# Patient Record
Sex: FEMALE | Race: OTHER | HISPANIC OR LATINO | URBAN - METROPOLITAN AREA
[De-identification: names, ages, dates, MRNs, and addresses within clinical notes are randomized per-mention and may not be internally consistent; named-entity substitution may affect disease eponyms.]

---

## 2024-01-31 ENCOUNTER — EMERGENCY (EMERGENCY)
Facility: HOSPITAL | Age: 29
LOS: 0 days | Discharge: HOME | End: 2024-01-31
Attending: EMERGENCY MEDICINE
Payer: COMMERCIAL

## 2024-01-31 VITALS
TEMPERATURE: 98 F | HEART RATE: 71 BPM | DIASTOLIC BLOOD PRESSURE: 85 MMHG | SYSTOLIC BLOOD PRESSURE: 128 MMHG | OXYGEN SATURATION: 100 %

## 2024-01-31 VITALS — WEIGHT: 214.95 LBS | HEIGHT: 62 IN

## 2024-01-31 DIAGNOSIS — D72.829 ELEVATED WHITE BLOOD CELL COUNT, UNSPECIFIED: ICD-10-CM

## 2024-01-31 DIAGNOSIS — N93.9 ABNORMAL UTERINE AND VAGINAL BLEEDING, UNSPECIFIED: ICD-10-CM

## 2024-01-31 LAB
ABO RH CONFIRMATION: SIGNIFICANT CHANGE UP
ALBUMIN SERPL ELPH-MCNC: 3.6 G/DL — SIGNIFICANT CHANGE UP (ref 3.3–5)
ALP SERPL-CCNC: 83 U/L — SIGNIFICANT CHANGE UP (ref 40–120)
ALT FLD-CCNC: 28 U/L — SIGNIFICANT CHANGE UP (ref 12–78)
ANION GAP SERPL CALC-SCNC: 3 MMOL/L — LOW (ref 5–17)
APTT BLD: 38.1 SEC — HIGH (ref 24.5–35.6)
AST SERPL-CCNC: 19 U/L — SIGNIFICANT CHANGE UP (ref 15–37)
BASOPHILS # BLD AUTO: 0.04 K/UL — SIGNIFICANT CHANGE UP (ref 0–0.2)
BASOPHILS NFR BLD AUTO: 0.3 % — SIGNIFICANT CHANGE UP (ref 0–2)
BILIRUB SERPL-MCNC: 0.3 MG/DL — SIGNIFICANT CHANGE UP (ref 0.2–1.2)
BLD GP AB SCN SERPL QL: SIGNIFICANT CHANGE UP
BUN SERPL-MCNC: 17 MG/DL — SIGNIFICANT CHANGE UP (ref 7–23)
CALCIUM SERPL-MCNC: 8.9 MG/DL — SIGNIFICANT CHANGE UP (ref 8.5–10.1)
CHLORIDE SERPL-SCNC: 111 MMOL/L — HIGH (ref 96–108)
CO2 SERPL-SCNC: 26 MMOL/L — SIGNIFICANT CHANGE UP (ref 22–31)
CREAT SERPL-MCNC: 0.73 MG/DL — SIGNIFICANT CHANGE UP (ref 0.5–1.3)
EGFR: 115 ML/MIN/1.73M2 — SIGNIFICANT CHANGE UP
EOSINOPHIL # BLD AUTO: 0.15 K/UL — SIGNIFICANT CHANGE UP (ref 0–0.5)
EOSINOPHIL NFR BLD AUTO: 1.3 % — SIGNIFICANT CHANGE UP (ref 0–6)
GLUCOSE SERPL-MCNC: 102 MG/DL — HIGH (ref 70–99)
HCG SERPL-ACNC: <1 MIU/ML — SIGNIFICANT CHANGE UP
HCT VFR BLD CALC: 39.2 % — SIGNIFICANT CHANGE UP (ref 34.5–45)
HGB BLD-MCNC: 13.2 G/DL — SIGNIFICANT CHANGE UP (ref 11.5–15.5)
IMM GRANULOCYTES NFR BLD AUTO: 0.3 % — SIGNIFICANT CHANGE UP (ref 0–0.9)
INR BLD: 1.15 RATIO — SIGNIFICANT CHANGE UP (ref 0.85–1.18)
LYMPHOCYTES # BLD AUTO: 26.8 % — SIGNIFICANT CHANGE UP (ref 13–44)
LYMPHOCYTES # BLD AUTO: 3.21 K/UL — SIGNIFICANT CHANGE UP (ref 1–3.3)
MCHC RBC-ENTMCNC: 28.9 PG — SIGNIFICANT CHANGE UP (ref 27–34)
MCHC RBC-ENTMCNC: 33.7 GM/DL — SIGNIFICANT CHANGE UP (ref 32–36)
MCV RBC AUTO: 86 FL — SIGNIFICANT CHANGE UP (ref 80–100)
MONOCYTES # BLD AUTO: 0.65 K/UL — SIGNIFICANT CHANGE UP (ref 0–0.9)
MONOCYTES NFR BLD AUTO: 5.4 % — SIGNIFICANT CHANGE UP (ref 2–14)
NEUTROPHILS # BLD AUTO: 7.91 K/UL — HIGH (ref 1.8–7.4)
NEUTROPHILS NFR BLD AUTO: 65.9 % — SIGNIFICANT CHANGE UP (ref 43–77)
PLATELET # BLD AUTO: 210 K/UL — SIGNIFICANT CHANGE UP (ref 150–400)
POTASSIUM SERPL-MCNC: 3.9 MMOL/L — SIGNIFICANT CHANGE UP (ref 3.5–5.3)
POTASSIUM SERPL-SCNC: 3.9 MMOL/L — SIGNIFICANT CHANGE UP (ref 3.5–5.3)
PROT SERPL-MCNC: 7.5 GM/DL — SIGNIFICANT CHANGE UP (ref 6–8.3)
PROTHROM AB SERPL-ACNC: 12.9 SEC — SIGNIFICANT CHANGE UP (ref 9.5–13)
RBC # BLD: 4.56 M/UL — SIGNIFICANT CHANGE UP (ref 3.8–5.2)
RBC # FLD: 12.3 % — SIGNIFICANT CHANGE UP (ref 10.3–14.5)
SODIUM SERPL-SCNC: 140 MMOL/L — SIGNIFICANT CHANGE UP (ref 135–145)
WBC # BLD: 12 K/UL — HIGH (ref 3.8–10.5)
WBC # FLD AUTO: 12 K/UL — HIGH (ref 3.8–10.5)

## 2024-01-31 PROCEDURE — 76830 TRANSVAGINAL US NON-OB: CPT

## 2024-01-31 PROCEDURE — 86901 BLOOD TYPING SEROLOGIC RH(D): CPT

## 2024-01-31 PROCEDURE — 85610 PROTHROMBIN TIME: CPT

## 2024-01-31 PROCEDURE — 99284 EMERGENCY DEPT VISIT MOD MDM: CPT

## 2024-01-31 PROCEDURE — 86850 RBC ANTIBODY SCREEN: CPT

## 2024-01-31 PROCEDURE — 36415 COLL VENOUS BLD VENIPUNCTURE: CPT

## 2024-01-31 PROCEDURE — 85730 THROMBOPLASTIN TIME PARTIAL: CPT

## 2024-01-31 PROCEDURE — 80053 COMPREHEN METABOLIC PANEL: CPT

## 2024-01-31 PROCEDURE — 86900 BLOOD TYPING SEROLOGIC ABO: CPT

## 2024-01-31 PROCEDURE — 85025 COMPLETE CBC W/AUTO DIFF WBC: CPT

## 2024-01-31 PROCEDURE — 76830 TRANSVAGINAL US NON-OB: CPT | Mod: 26

## 2024-01-31 PROCEDURE — 99284 EMERGENCY DEPT VISIT MOD MDM: CPT | Mod: 25

## 2024-01-31 PROCEDURE — 84702 CHORIONIC GONADOTROPIN TEST: CPT

## 2024-01-31 NOTE — ED ADULT TRIAGE NOTE - CHIEF COMPLAINT QUOTE
pt presents to the ED c/o heavy vaginal bleeding and cramping for 10 days. reports going to family services in November and states "I got a shot and this is my first time bleeding since November."  reports pain in pelvic area. denies dizziness. pt A&Ox4, well appearing, and ambulatory at baseline with no further complaints or discomforts reported at this time.

## 2024-01-31 NOTE — ED STATDOCS - CLINICAL SUMMARY MEDICAL DECISION MAKING FREE TEXT BOX
In summary this is a 28-year-old female who presents chief complaint of vaginal bleeding.  Vital signs are within normal limits on arrival.  Labs reveal mild leukocytosis 12.0, normal hemoglobin 13.2, normal platelets, normal coags.  CMP was unremarkable, hCG was undetectable.  Ultrasound of the pelvis demonstrates heterogeneous thickened endometrium measuring up to 22 mm, no internal vascularity.  Patient hemodynamically stable, with normal hemoglobin, likely represents abnormal uterine bleeding on first menstruation status post last Depo injection.  Okay for discharge home at this time with supportive care.  Recommend close follow-up with her gynecologist.  Cannot take NSAIDs as needed to help with bleeding and pelvic pain.  Strict return precautions given for any worsening.  Patient verbalized understanding agrees plan this time.

## 2024-01-31 NOTE — ED STATDOCS - PATIENT PORTAL LINK FT
You can access the FollowMyHealth Patient Portal offered by [facility] by registering at the following website: [email or web address]. By joining [facility]’s FollowMyHealth portal, you will also be able to view your health information using other applications (apps) compatible with our system.

## 2024-01-31 NOTE — ED STATDOCS - PROGRESS NOTE DETAILS
signed Aruna Joy PA-C Pt seen initially in intake by Dr Avelar.   28F c/o vaginal bleeding for 12 days, a pad an hour with clots. LMP 12/20. She gets depo provera shot. GYN Domingo, she has appt for 2/5. Today at work her vision was going 'dark' so she came to ER for eval. Pt alert, NAD. plan labs, sono, re-eval. Pt agrees with plan of  care. Pt signed out to me by Aruna Joy PA-C. Labs and imaging reviewed. H/H stable, US demonstrated thickened endometrium, no other abnormalities. D/w patient. Stable for dc home with GYN follow up. Strict return precautions were given. All questions and concerns were addressed. - LEONA LoweryC

## 2024-01-31 NOTE — ED STATDOCS - OBJECTIVE STATEMENT
29 y/o female presents to the ED c/o vaginal bleeding associated with clots for the past 2 weeks. Pt states back in November she received contraceptive injection several months ago and has not had period since prior to this episode., no hx of abnormal vaginale bleeding in the past. Pt is going through 3 pads an hour, had one episode of lightheadedness. Pt has GYN  f/u on 2/5/24    GYN: Domingo   : language line: [id number]

## 2024-01-31 NOTE — ED ADULT NURSE NOTE - NSFALLUNIVINTERV_ED_ALL_ED
Bed/Stretcher in lowest position, wheels locked, appropriate side rails in place/Call bell, personal items and telephone in reach/Instruct patient to call for assistance before getting out of bed/chair/stretcher/Non-slip footwear applied when patient is off stretcher/Whitt to call system/Physically safe environment - no spills, clutter or unnecessary equipment/Purposeful proactive rounding/Room/bathroom lighting operational, light cord in reach

## 2024-01-31 NOTE — ED STATDOCS - NSFOLLOWUPINSTRUCTIONS_ED_ALL_ED_FT
Seguimiento con tu ginecóloga. Regrese al Departamento de Emergencias si los síntomas empeoran o persisten, y/o CUALQUIER SÍNTOMA NUEVO O PREOCUPANTE. Si tiene problemas para obtener un seguimiento, llame al: [phone] ([phone]) o al [phone] para obtener un médico o especialista que acepte duldey seguro en dudley área.    Hemorragia uterina anormal (disfuncional)    LO QUE NECESITA SABER:    ¿Qué es la hemorragia uterina anormal (ALEXANDREA)?La hemorragia uterina anormal es fermin hemorragia uterina que no es habitual en usted. También puede llamarse hemorragia uterina disfuncional. Es posible que dudley útero patrick en un momento que no sea dudley período menstrual regular. Kaela menstruaciones pueden durar más o ser más cortos, y podría sangrar fermin mayor o chucho cantidad que de costumbre. La hemorragia uterina anormal puede ser aguda (de corta duración) o crónica (de más de 6 meses).  Sistema reproductor femenino    ¿Qué causa la hemorragia uterina anormal?Los siguientes podrían causar o aumentar dudley riesgo de hemorragia uterina anormal:    Edad mayor de 40 años o chucho de 14 años    Niveles muy altos o muy bajos de estrógeno    Un ovario que no libera un óvulo corrine la ovulación    Fermin afección médica, yosvany el síndrome de ovario poliquístico (SOPQ), diabetes o fermin enfermedad renal o hepática a barry plazo    No mukesh a royer    Crecimientos, yosvany un tumor o un pólipo    Traumatismo, yosvany fermin lesión en el útero o el radha uterino    Fermin infección, yosvany fermin infección de transmisión sexual    Hipertiroidismo, hipotiroidismo o glándula suprarrenal poco activa    Un trastorno alimentario, yosvany la bulimia o la anorexia, o demasiado ejercicio    Ciertos medicamentos o terapia de reemplazo hormonal    Mucho aumento o mucha pérdida de peso  ¿Cuáles son los signos y síntomas de la hemorragia uterina anormal?    Sangrado o manchas de patrick entre los períodos menstruales    Hemorragia que comienza 12 meses o más tarde después de elaine pasado la menopausia    Sangrado más o menos abundante que de costumbre corrine kaela períodos menstruales    Sangra más de lo usual o más de 7 días corrine dudley período menstrual regular    Sangra menos que de costumbre o menos de 2 días    Intervalo de tiempo más corto o más barry que de costumbre entre kaela períodos menstruales  ¿Cómo se diagnostica la hemorragia uterina anormal?Dudley médico le preguntará sobre kaela períodos menstruales. Infórmele sobre todos los cambios en kaela períodos. Incluya cuándo comenzaron los cambios. Dudley médico puede preguntarle la edad que tenía cuando tuvo dudley primer período. Puede preguntarle si usa tampones o toallas sanitarias. Infórmele de cualquier afección médica que tenga y de todos los medicamentos que tabatha actualmente. Podría necesitar cualquiera de los siguientes, dependiendo de dudley edad y kaela antecedentes médicos:    Los análisis de sangrese pueden hacer para encontrar la causa de dudley hemorragia uterina anormal y los problemas que causa, yosvany la anemia. Es posible que dudley médico le recomiende la detección de fermin ITS yosvany parte de los análisis de patrick.    Un examen pélvicopara encontrar la causa de dudley hemorragia.    Fermin histeroscopiaes un procedimiento para examinar dudley endometrio. El endometrio es el revestimiento por dentro del útero. Dudley médico insertará en dudley útero un tubo pequeño con fermin cámara en la punta.    Fermin biopsiaes un procedimiento para extraer fermin pequeña muestra de tejido del endometrio. El tejido se envía a un laboratorio para hacerle exámenes.    Fermin ecografíausa ondas sonoras para mostrar imágenes de dudley útero, ovarios, trompas de Falopio y vagina en fermin pantalla.    Fermin prueba de Papanicolaoupodría ser necesario. Dudley médico tomará fermin muestra de tejido de dudley radha uterino y la enviará a un laboratorio para analizarla.  ¿Cómo se trata la hemorragia uterina anormal?    Los medicamentospueden administrarse para tratar fermin afección que cause fermin hemorragia uterina anormal. Por ejemplo, un medicamento para aumentar o disminuir la cantidad de hormona producida por la tiroides. Las hormonas podrían administrarse para ayudar a reducir la hemorragia regularizando los períodos menstruales. A veces naye medicamento se administra en forma de pastillas anticonceptivas. Pueden administrarle suplementos de tino si dudley nivel de tino en la patrick disminuye debido a la hemorragia intensa.    Procedimientoscomo la ablación del endometrio o la dilatación y el legrado pueden utilizarse para controlar la hemorragia.    La cirugíapueden ser necesarios si no se pueden usar medicamentos o los medicamentos no dan resultado. Es posible que necesite fermin histerectomía abdominal o vaginal. Fermin histerectomía es fermin cirugía para extraer el útero.  ¿Cómo me cuido en mi hogar?    Consuma alimentos con un alto contenido de tino de ser necesario.Las verduras de hoja conner, la carne de res, puerco, hígado, los huevos y los panes y cereales integrales son algunos ejemplos de alimentos con un alto contenido de tino.  Amin de tino      Lleve un diario de kaela períodos menstruales.Anote la cantidad de tampones o toallas higiénicas que usa a diario.    Consulte con dudley médico antes de comenzar un programa para bajar de peso.Es posible que deba esperar a que la hemorragia anormal se haya detenido para tratar de bajar de peso. La cantidad de tino en dudley patrick debería ser normal antes de perder peso. Pregunte a dudley médico si la pérdida de peso ayudará a controlar la hemorragia uterina anormal. Puede indicarle cuál es el peso saludable para usted. Puede ayudarlo a crear un plan para perder peso de manera joseph, si lo necesita.  ¿Cuándo sandy buscar atención inmediata?    Continúa sangrando mucho o se siente mareado.    ¿Cuándo sandy llamar a mi médico o ginecólogo?    Necesita cambiar dudley toalla higiénica o tampón más de 1 vez por hora.    Dudley medicamento le da náuseas, vómitos o diarrea.    Usted tiene preguntas o inquietudes acerca de dudley condición o cuidado.  ACUERDOS SOBRE DUDLEY CUIDADO:    Usted tiene el derecho de ayudar a planear dudley cuidado. Aprenda todo lo que pueda sobre dudley condición y yosvany darle tratamiento. Discuta kaela opciones de tratamiento con kaela médicos para decidir el cuidado que usted desea recibir. Usted siempre tiene el derecho de rechazar el tratamiento.

## 2024-01-31 NOTE — ED ADULT NURSE NOTE - OBJECTIVE STATEMENT
27 y/o female presents to the ED c/o vaginal bleeding associated with clots for the past 2 weeks. Pt states back in November she received contraceptive injection several months ago and has not had period since prior to this episode., no hx of abnormal vaginale bleeding in the past. Pt is going through 3 pads an hour, had one episode of lightheadedness. Pt has GYN  f/u on 2/5/24

## 2025-04-30 ENCOUNTER — EMERGENCY (EMERGENCY)
Facility: HOSPITAL | Age: 30
LOS: 0 days | Discharge: HOME | End: 2025-05-01
Attending: HOSPITALIST
Payer: SELF-PAY

## 2025-04-30 VITALS
RESPIRATION RATE: 20 BRPM | TEMPERATURE: 98 F | OXYGEN SATURATION: 100 % | WEIGHT: 158.95 LBS | SYSTOLIC BLOOD PRESSURE: 101 MMHG | HEART RATE: 61 BPM | DIASTOLIC BLOOD PRESSURE: 66 MMHG

## 2025-04-30 DIAGNOSIS — Z3A.00 WEEKS OF GESTATION OF PREGNANCY NOT SPECIFIED: ICD-10-CM

## 2025-04-30 DIAGNOSIS — O99.891 OTHER SPECIFIED DISEASES AND CONDITIONS COMPLICATING PREGNANCY: ICD-10-CM

## 2025-04-30 DIAGNOSIS — R51.9 HEADACHE, UNSPECIFIED: ICD-10-CM

## 2025-04-30 DIAGNOSIS — Z98.84 BARIATRIC SURGERY STATUS: ICD-10-CM

## 2025-04-30 DIAGNOSIS — R42 DIZZINESS AND GIDDINESS: ICD-10-CM

## 2025-04-30 DIAGNOSIS — R11.0 NAUSEA: ICD-10-CM

## 2025-04-30 PROCEDURE — T1013: CPT

## 2025-04-30 PROCEDURE — 84702 CHORIONIC GONADOTROPIN TEST: CPT

## 2025-04-30 PROCEDURE — 99283 EMERGENCY DEPT VISIT LOW MDM: CPT | Mod: 25

## 2025-04-30 PROCEDURE — 85025 COMPLETE CBC W/AUTO DIFF WBC: CPT

## 2025-04-30 PROCEDURE — 36000 PLACE NEEDLE IN VEIN: CPT

## 2025-04-30 PROCEDURE — 93010 ELECTROCARDIOGRAM REPORT: CPT

## 2025-04-30 PROCEDURE — 99284 EMERGENCY DEPT VISIT MOD MDM: CPT

## 2025-04-30 PROCEDURE — 93005 ELECTROCARDIOGRAM TRACING: CPT

## 2025-04-30 PROCEDURE — 80053 COMPREHEN METABOLIC PANEL: CPT

## 2025-04-30 PROCEDURE — 81025 URINE PREGNANCY TEST: CPT

## 2025-04-30 PROCEDURE — 36415 COLL VENOUS BLD VENIPUNCTURE: CPT

## 2025-04-30 PROCEDURE — 83690 ASSAY OF LIPASE: CPT

## 2025-04-30 PROCEDURE — 81001 URINALYSIS AUTO W/SCOPE: CPT

## 2025-04-30 NOTE — ED ADULT TRIAGE NOTE - CHIEF COMPLAINT QUOTE
29yF AOx4 ambulatory, presents to  ED w/ multiple medical complaints x5 days. pt endorsing HA, congestion, generalized weakness, lightheadedness, and feels like she is going to pass out, and chills. pt denies N/V/D. - sick contacts at home. PMH gastric sleeve. - med PTA.

## 2025-04-30 NOTE — ED ADULT NURSE NOTE - OBJECTIVE STATEMENT
29 year old female presenting to ER with 1 week hx of headaches, feeling dizzy and feeling faint, + nausea but no vomiting, Pt with chills but denies fever, no sick contacts. Denies CP or SOB, jordan. fluids well. Pt does not have primary MD so has not seen anyone for symptoms, has not taken any Tylenol or Motrin for headache. 29 year old female presenting to ER with 1 week hx of headaches, feeling dizzy and feeling faint, + nausea but no vomiting, Pt with chills but denies fever, no sick contacts. Denies CP or SOB, jordan. fluids well. Pt does not have primary MD so has not seen anyone for symptoms, has not taken any Tylenol or Motrin for headache.  Isa [id number]

## 2025-05-01 VITALS
TEMPERATURE: 98 F | HEART RATE: 73 BPM | RESPIRATION RATE: 16 BRPM | SYSTOLIC BLOOD PRESSURE: 104 MMHG | DIASTOLIC BLOOD PRESSURE: 55 MMHG | OXYGEN SATURATION: 100 %

## 2025-05-01 DIAGNOSIS — Z98.84 BARIATRIC SURGERY STATUS: Chronic | ICD-10-CM

## 2025-05-01 LAB
ADD ON TEST-SPECIMEN IN LAB: SIGNIFICANT CHANGE UP
ADD ON TEST-SPECIMEN IN LAB: SIGNIFICANT CHANGE UP
ALBUMIN SERPL ELPH-MCNC: 3.5 G/DL — SIGNIFICANT CHANGE UP (ref 3.3–5)
ALP SERPL-CCNC: 66 U/L — SIGNIFICANT CHANGE UP (ref 40–120)
ALT FLD-CCNC: 14 U/L — SIGNIFICANT CHANGE UP (ref 12–78)
ANION GAP SERPL CALC-SCNC: 5 MMOL/L — SIGNIFICANT CHANGE UP (ref 5–17)
APPEARANCE UR: ABNORMAL
AST SERPL-CCNC: 12 U/L — LOW (ref 15–37)
BACTERIA # UR AUTO: ABNORMAL /HPF
BASOPHILS # BLD AUTO: 0.06 K/UL — SIGNIFICANT CHANGE UP (ref 0–0.2)
BASOPHILS # BLD MANUAL: 0 K/UL — SIGNIFICANT CHANGE UP (ref 0–0.2)
BASOPHILS NFR BLD AUTO: 0.5 % — SIGNIFICANT CHANGE UP (ref 0–2)
BASOPHILS NFR BLD MANUAL: 0 % — SIGNIFICANT CHANGE UP (ref 0–2)
BILIRUB SERPL-MCNC: 0.3 MG/DL — SIGNIFICANT CHANGE UP (ref 0.2–1.2)
BILIRUB UR-MCNC: NEGATIVE — SIGNIFICANT CHANGE UP
BUN SERPL-MCNC: 12 MG/DL — SIGNIFICANT CHANGE UP (ref 7–23)
CALCIUM SERPL-MCNC: 8.9 MG/DL — SIGNIFICANT CHANGE UP (ref 8.5–10.1)
CAST: 2 /LPF — SIGNIFICANT CHANGE UP (ref 0–4)
CHLORIDE SERPL-SCNC: 110 MMOL/L — HIGH (ref 96–108)
CO2 SERPL-SCNC: 22 MMOL/L — SIGNIFICANT CHANGE UP (ref 22–31)
COLOR SPEC: YELLOW — SIGNIFICANT CHANGE UP
CREAT SERPL-MCNC: 0.62 MG/DL — SIGNIFICANT CHANGE UP (ref 0.5–1.3)
DACRYOCYTES BLD QL SMEAR: SLIGHT — SIGNIFICANT CHANGE UP
DIFF PNL FLD: NEGATIVE — SIGNIFICANT CHANGE UP
EGFR: 124 ML/MIN/1.73M2 — SIGNIFICANT CHANGE UP
EGFR: 124 ML/MIN/1.73M2 — SIGNIFICANT CHANGE UP
EOSINOPHIL # BLD AUTO: 0.25 K/UL — SIGNIFICANT CHANGE UP (ref 0–0.5)
EOSINOPHIL # BLD MANUAL: 0.12 K/UL — SIGNIFICANT CHANGE UP (ref 0–0.5)
EOSINOPHIL NFR BLD AUTO: 2.2 % — SIGNIFICANT CHANGE UP (ref 0–6)
EOSINOPHIL NFR BLD MANUAL: 1 % — SIGNIFICANT CHANGE UP (ref 0–6)
GLUCOSE SERPL-MCNC: 84 MG/DL — SIGNIFICANT CHANGE UP (ref 70–99)
GLUCOSE UR QL: NEGATIVE MG/DL — SIGNIFICANT CHANGE UP
HCG SERPL-ACNC: 70 MIU/ML — HIGH
HCT VFR BLD CALC: 39.9 % — SIGNIFICANT CHANGE UP (ref 34.5–45)
HGB BLD-MCNC: 13.2 G/DL — SIGNIFICANT CHANGE UP (ref 11.5–15.5)
IMM GRANULOCYTES # BLD AUTO: 0.02 K/UL — SIGNIFICANT CHANGE UP (ref 0–0.07)
IMM GRANULOCYTES NFR BLD AUTO: 0.2 % — SIGNIFICANT CHANGE UP (ref 0–0.9)
KETONES UR-MCNC: ABNORMAL MG/DL
LEUKOCYTE ESTERASE UR-ACNC: ABNORMAL
LIDOCAIN IGE QN: 30 U/L — SIGNIFICANT CHANGE UP (ref 13–75)
LYMPHOCYTES # BLD AUTO: 4.16 K/UL — HIGH (ref 1–3.3)
LYMPHOCYTES # BLD MANUAL: 4.15 K/UL — HIGH (ref 1–3.3)
LYMPHOCYTES NFR BLD AUTO: 36.1 % — SIGNIFICANT CHANGE UP (ref 13–44)
LYMPHOCYTES NFR BLD MANUAL: 36 % — SIGNIFICANT CHANGE UP (ref 13–44)
MANUAL OTHER CELLS #: 0.12 K/UL — HIGH (ref 0–0)
MANUAL OTHER CELLS %: 1 % — HIGH (ref 0–0)
MANUAL REACTIVE LYMPHOCYTES #: 0.58 K/UL — SIGNIFICANT CHANGE UP (ref 0–0.63)
MCHC RBC-ENTMCNC: 29.1 PG — SIGNIFICANT CHANGE UP (ref 27–34)
MCHC RBC-ENTMCNC: 33.1 G/DL — SIGNIFICANT CHANGE UP (ref 32–36)
MCV RBC AUTO: 87.9 FL — SIGNIFICANT CHANGE UP (ref 80–100)
MONOCYTES # BLD AUTO: 0.58 K/UL — SIGNIFICANT CHANGE UP (ref 0–0.9)
MONOCYTES # BLD MANUAL: 0.35 K/UL — SIGNIFICANT CHANGE UP (ref 0–0.9)
MONOCYTES NFR BLD AUTO: 5 % — SIGNIFICANT CHANGE UP (ref 2–14)
MONOCYTES NFR BLD MANUAL: 3 % — SIGNIFICANT CHANGE UP (ref 2–14)
NEUTROPHILS # BLD AUTO: 6.45 K/UL — SIGNIFICANT CHANGE UP (ref 1.8–7.4)
NEUTROPHILS # BLD MANUAL: 6.22 K/UL — SIGNIFICANT CHANGE UP (ref 1.8–7.4)
NEUTROPHILS NFR BLD AUTO: 56 % — SIGNIFICANT CHANGE UP (ref 43–77)
NEUTROPHILS NFR BLD MANUAL: 54 % — SIGNIFICANT CHANGE UP (ref 43–77)
NITRITE UR-MCNC: NEGATIVE — SIGNIFICANT CHANGE UP
NRBC # BLD AUTO: 0 K/UL — SIGNIFICANT CHANGE UP (ref 0–0)
NRBC # FLD: 0 K/UL — SIGNIFICANT CHANGE UP (ref 0–0)
NRBC BLD AUTO-RTO: 0 /100 WBCS — SIGNIFICANT CHANGE UP (ref 0–0)
OVALOCYTES BLD QL SMEAR: SLIGHT — SIGNIFICANT CHANGE UP
PH UR: 5 — SIGNIFICANT CHANGE UP (ref 5–8)
PLAT MORPH BLD: NORMAL — SIGNIFICANT CHANGE UP
PLATELET # BLD AUTO: 224 K/UL — SIGNIFICANT CHANGE UP (ref 150–400)
PMV BLD: 11.2 FL — SIGNIFICANT CHANGE UP (ref 7–13)
POCT URINE PREGNANCY TEST: POSITIVE
POTASSIUM SERPL-MCNC: 3.7 MMOL/L — SIGNIFICANT CHANGE UP (ref 3.5–5.3)
POTASSIUM SERPL-SCNC: 3.7 MMOL/L — SIGNIFICANT CHANGE UP (ref 3.5–5.3)
PROT SERPL-MCNC: 7.5 GM/DL — SIGNIFICANT CHANGE UP (ref 6–8.3)
PROT UR-MCNC: NEGATIVE MG/DL — SIGNIFICANT CHANGE UP
RBC # BLD: 4.54 M/UL — SIGNIFICANT CHANGE UP (ref 3.8–5.2)
RBC # FLD: 12.4 % — SIGNIFICANT CHANGE UP (ref 10.3–14.5)
RBC BLD AUTO: ABNORMAL
RBC CASTS # UR COMP ASSIST: 8 /HPF — HIGH (ref 0–4)
SODIUM SERPL-SCNC: 137 MMOL/L — SIGNIFICANT CHANGE UP (ref 135–145)
SP GR SPEC: 1.03 — SIGNIFICANT CHANGE UP (ref 1–1.03)
SQUAMOUS # UR AUTO: 9 /HPF — HIGH (ref 0–5)
UROBILINOGEN FLD QL: 1 MG/DL — SIGNIFICANT CHANGE UP (ref 0.2–1)
VARIANT LYMPHS # BLD: 5 % — SIGNIFICANT CHANGE UP (ref 0–6)
VARIANT LYMPHS NFR BLD MANUAL: 5 % — SIGNIFICANT CHANGE UP (ref 0–6)
WBC # BLD: 11.52 K/UL — HIGH (ref 3.8–10.5)
WBC # FLD AUTO: 11.52 K/UL — HIGH (ref 3.8–10.5)
WBC MORPHOLOGY: NORMAL — SIGNIFICANT CHANGE UP
WBC UR QL: 9 /HPF — HIGH (ref 0–5)

## 2025-05-01 RX ADMIN — Medication 1000 MILLILITER(S): at 00:30

## 2025-05-01 NOTE — ED PROVIDER NOTE - OBJECTIVE STATEMENT
NANCY  for Kiswahili ID # [id number]     29-year-old female status post gastric bypass 1 year ago presents with mild headache and intermittent dizziness and feeling a little lightheaded over the past week.  Patient states she is also had intermittent nausea.  Not really sure why she is feeling this way denies any new changes or sick contacts or recent travel.  States she also felt this way approximately 1 year ago after her gastric bypass but symptoms were self-limiting and she did not seek medical attention and it resolved spontaneously.  Denies any weight loss, dysuria.  Does not believe she is pregnant.   Has lost a total of about 60 pounds since gastric bypass over the course of a year.  Nothing recently.

## 2025-05-01 NOTE — ED PROVIDER NOTE - NSFOLLOWUPINSTRUCTIONS_ED_ALL_ED_FT
Please follow-up at the [facility] for gynecologic care for early pregnancy.  please do not take any medications at all unless instructed by a doctor and it is recommended to start a prenatal vitamin.  Please avoid any unprocessed dairy products, cold cuts and raw or uncooked meats infection.  Please avoid all types of alcohol.

## 2025-05-01 NOTE — ED ADULT NURSE REASSESSMENT NOTE - NS ED NURSE REASSESS COMMENT FT1
Pt informed by Dr. Mcelroy that pregnancy test positive, spoke with pt using  phone, verbalizes understanding of lab results.

## 2025-05-01 NOTE — ED PROVIDER NOTE - CLINICAL SUMMARY MEDICAL DECISION MAKING FREE TEXT BOX
29-year-old female with mild headache, dizziness, lightheadedness and nausea for the past week intermittently.  She is otherwise well-appearing.  Will obtain screening labs and hydrate with IV fluids to evaluate 29-year-old female with mild headache, dizziness, lightheadedness and nausea for the past week intermittently.  She is otherwise well-appearing.  Will obtain screening labs and hydrate with IV fluids to evaluate. Lab results reviewed and patient noted to be positive on urine and blood pregnancy test.  All of these results were discussed with patient. will refer to the [facility] for GYN follow-up care. Fluconazole Counseling:  Patient counseled regarding adverse effects of fluconazole including but not limited to headache, diarrhea, nausea, upset stomach, liver function test abnormalities, taste disturbance, and stomach pain.  There is a rare possibility of liver failure that can occur when taking fluconazole.  The patient understands that monitoring of LFTs and kidney function test may be required, especially at baseline. The patient verbalized understanding of the proper use and possible adverse effects of fluconazole.  All of the patient's questions and concerns were addressed.

## 2025-05-01 NOTE — ED PROVIDER NOTE - NSFOLLOWUPCLINICS_GEN_ALL_ED_FT
[facility] [id number] [place] [address]  Phone: [phone]  Fax:   Follow Up Time: 4-6 Days

## 2025-05-01 NOTE — ED PROVIDER NOTE - PATIENT PORTAL LINK FT
You can access the FollowMyHealth Patient Portal offered by [facility] by registering at the following website: [email or web address]. By joining [facility]’s FollowMyHealth portal, you will also be able to view your health information using other applications (apps) compatible with our system.

## 2025-05-20 ENCOUNTER — ASOB RESULT (OUTPATIENT)
Age: 30
End: 2025-05-20

## 2025-05-20 ENCOUNTER — TRANSCRIPTION ENCOUNTER (OUTPATIENT)
Age: 30
End: 2025-05-20

## 2025-05-20 ENCOUNTER — APPOINTMENT (OUTPATIENT)
Dept: ANTEPARTUM | Facility: CLINIC | Age: 30
End: 2025-05-20
Payer: COMMERCIAL

## 2025-05-20 PROBLEM — Z00.00 ENCOUNTER FOR PREVENTIVE HEALTH EXAMINATION: Status: ACTIVE | Noted: 2025-05-20

## 2025-05-20 PROCEDURE — 76856 US EXAM PELVIC COMPLETE: CPT | Mod: 59

## 2025-05-20 PROCEDURE — 76830 TRANSVAGINAL US NON-OB: CPT
